# Patient Record
Sex: MALE | Race: ASIAN | NOT HISPANIC OR LATINO
[De-identification: names, ages, dates, MRNs, and addresses within clinical notes are randomized per-mention and may not be internally consistent; named-entity substitution may affect disease eponyms.]

---
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
## 2018-02-12 ENCOUNTER — APPOINTMENT (OUTPATIENT)
Dept: ENDOCRINOLOGY | Facility: CLINIC | Age: 18
End: 2018-02-12
Payer: COMMERCIAL

## 2018-02-12 VITALS
HEIGHT: 68 IN | OXYGEN SATURATION: 99 % | BODY MASS INDEX: 20 KG/M2 | HEART RATE: 87 BPM | SYSTOLIC BLOOD PRESSURE: 120 MMHG | WEIGHT: 132 LBS | DIASTOLIC BLOOD PRESSURE: 64 MMHG

## 2018-02-12 LAB
GLUCOSE BLDC GLUCOMTR-MCNC: 81
HBA1C MFR BLD HPLC: 8.7

## 2018-02-12 PROCEDURE — 83036 HEMOGLOBIN GLYCOSYLATED A1C: CPT | Mod: QW

## 2018-02-12 PROCEDURE — 99214 OFFICE O/P EST MOD 30 MIN: CPT

## 2018-02-12 PROCEDURE — 82962 GLUCOSE BLOOD TEST: CPT

## 2018-05-13 ENCOUNTER — LABORATORY RESULT (OUTPATIENT)
Age: 18
End: 2018-05-13

## 2018-05-14 ENCOUNTER — APPOINTMENT (OUTPATIENT)
Dept: ENDOCRINOLOGY | Facility: CLINIC | Age: 18
End: 2018-05-14
Payer: COMMERCIAL

## 2018-05-14 VITALS
OXYGEN SATURATION: 98 % | BODY MASS INDEX: 20.46 KG/M2 | HEART RATE: 100 BPM | HEIGHT: 68 IN | WEIGHT: 135 LBS | DIASTOLIC BLOOD PRESSURE: 80 MMHG | SYSTOLIC BLOOD PRESSURE: 120 MMHG

## 2018-05-14 DIAGNOSIS — Z83.3 FAMILY HISTORY OF DIABETES MELLITUS: ICD-10-CM

## 2018-05-14 LAB
GLUCOSE BLDC GLUCOMTR-MCNC: 228
HBA1C MFR BLD HPLC: 8

## 2018-05-14 PROCEDURE — 99214 OFFICE O/P EST MOD 30 MIN: CPT

## 2018-06-07 ENCOUNTER — APPOINTMENT (OUTPATIENT)
Dept: ENDOCRINOLOGY | Facility: CLINIC | Age: 18
End: 2018-06-07
Payer: COMMERCIAL

## 2018-06-07 PROCEDURE — G0108 DIAB MANAGE TRN  PER INDIV: CPT

## 2018-09-07 ENCOUNTER — APPOINTMENT (OUTPATIENT)
Dept: ENDOCRINOLOGY | Facility: CLINIC | Age: 18
End: 2018-09-07

## 2018-09-28 ENCOUNTER — APPOINTMENT (OUTPATIENT)
Dept: ENDOCRINOLOGY | Facility: CLINIC | Age: 18
End: 2018-09-28
Payer: COMMERCIAL

## 2018-09-28 LAB
GLUCOSE BLDC GLUCOMTR-MCNC: 108
HBA1C MFR BLD HPLC: 9

## 2018-09-28 PROCEDURE — 83036 HEMOGLOBIN GLYCOSYLATED A1C: CPT | Mod: QW

## 2018-09-28 PROCEDURE — 82962 GLUCOSE BLOOD TEST: CPT

## 2018-10-08 ENCOUNTER — CLINICAL ADVICE (OUTPATIENT)
Age: 18
End: 2018-10-08

## 2018-11-19 ENCOUNTER — APPOINTMENT (OUTPATIENT)
Dept: ENDOCRINOLOGY | Facility: CLINIC | Age: 18
End: 2018-11-19
Payer: COMMERCIAL

## 2018-11-19 VITALS
BODY MASS INDEX: 20 KG/M2 | WEIGHT: 132 LBS | OXYGEN SATURATION: 98 % | HEART RATE: 99 BPM | DIASTOLIC BLOOD PRESSURE: 70 MMHG | SYSTOLIC BLOOD PRESSURE: 120 MMHG | HEIGHT: 68 IN

## 2018-11-19 PROCEDURE — 99214 OFFICE O/P EST MOD 30 MIN: CPT

## 2019-05-10 ENCOUNTER — APPOINTMENT (OUTPATIENT)
Dept: ENDOCRINOLOGY | Facility: CLINIC | Age: 19
End: 2019-05-10
Payer: COMMERCIAL

## 2019-05-10 ENCOUNTER — TRANSCRIPTION ENCOUNTER (OUTPATIENT)
Age: 19
End: 2019-05-10

## 2019-05-10 VITALS
WEIGHT: 132 LBS | OXYGEN SATURATION: 98 % | SYSTOLIC BLOOD PRESSURE: 146 MMHG | BODY MASS INDEX: 20 KG/M2 | HEIGHT: 68 IN | HEART RATE: 136 BPM | DIASTOLIC BLOOD PRESSURE: 70 MMHG

## 2019-05-10 PROCEDURE — 99214 OFFICE O/P EST MOD 30 MIN: CPT

## 2019-05-10 NOTE — PHYSICAL EXAM
[No Acute Distress] : no acute distress [Alert] : alert [Well Nourished] : well nourished [Well Developed] : well developed [Normal Sclera/Conjunctiva] : normal sclera/conjunctiva [EOMI] : extra ocular movement intact [Normal Oropharynx] : the oropharynx was normal [No Proptosis] : no proptosis [No Thyroid Nodules] : there were no palpable thyroid nodules [No Respiratory Distress] : no respiratory distress [Thyroid Not Enlarged] : the thyroid was not enlarged [No Accessory Muscle Use] : no accessory muscle use [Normal S1, S2] : normal S1 and S2 [Normal Rate] : heart rate was normal  [Clear to Auscultation] : lungs were clear to auscultation bilaterally [Regular Rhythm] : with a regular rhythm [Pedal Pulses Normal] : the pedal pulses are present [No Edema] : there was no peripheral edema [Normal Bowel Sounds] : normal bowel sounds [Not Tender] : non-tender [Not Distended] : not distended [Soft] : abdomen soft [Post Cervical Nodes] : posterior cervical nodes [Anterior Cervical Nodes] : anterior cervical nodes [Axillary Nodes] : axillary nodes [No Spinal Tenderness] : no spinal tenderness [No Stigmata of Cushings Syndrome] : no stigmata of cushings syndrome [Spine Straight] : spine straight [Normal Gait] : normal gait [Normal Strength/Tone] : muscle strength and tone were normal [No Rash] : no rash [Full ROM] : with full range of motion [Normal] : normal [No Tremors] : no tremors [Normal Reflexes] : deep tendon reflexes were 2+ and symmetric [Oriented x3] : oriented to person, place, and time [Acanthosis Nigricans] : no acanthosis nigricans [Diminished Throughout Both Feet] : normal tactile sensation with monofilament testing throughout both feet

## 2019-05-10 NOTE — ASSESSMENT
[FreeTextEntry1] : Mr. ELI JENSEN is a pleasant 18 year old male with type 1 DM diagnosed at 18 months of age here for endocrine follo up. \par \par \par 1)Type 1 DM\par A1C today 05/10/2019 was 6.9%.  He does not have hypoglycemia. \par Notable to have high glucose level after breakfast. Insulin pump setting changes as below. \par Pump brand: MEDTRONIC \par Basal Rate: \par Start Time: 12:00 AM ----1.10 units\par Start Time: 8 -----  1.30 units/hour \par Start Time: 11 -----  1.10 units/hour \par Start Time: 17 ----- 1.40 units/hour \par Start Time: 20 -----  1.40 units/hour    \par Insulin to Carb Ration (I:C): \par Start Time: 12:00 AM -----10   carb/unit\par Start Time: 6 -----6.5  carb/unit --> 6.0 carb/unit\par Start Time: 12:30 ----- 7.5 carb/unit \par Start Time: 17:00 ----- 6.0 carb/unit\par Start stime: 20:00  ---- 12.0 carb/unit\par   \par Insulin Sensitivity Factor \par Start time: 0----80 \par start time:6:00 -- 75\par Start time: 20:00 --- 80\par \par BG Target =  \par \par Insulin on Board (IOB) Duration = 3 hours  \par Maximum Basal Rate = 2 units/hour \par Maximum Bolus = 15 units \par \par -will have patient repeat A1C in 1 month (last check Sept 2018) \par -He is up to date with thyroid screening, check thyroid function today (last check one year ago. \par -He has glucagon, ketone strips and medical bracelet. \par  [Hypoglycemia Management] : hypoglycemia management [Carbohydrate Consistent Diet] : carbohydrate consistent diet [Glucagon Use] : glucagon use [Ketone Testing] : ketone testing [Diabetes Foot Care] : diabetes foot care [Action and use of Insulin] : action and use of short and long-acting insulin [Insulin Self-Administration] : insulin self-administration [Injection Technique, Storage, Sharps Disposal] : injection technique, storage, and sharps disposal [Self Monitoring of Blood Glucose] : self monitoring of blood glucose [Retinopathy Screening] : Patient was referred to ophthalmology for retinopathy screening

## 2019-05-10 NOTE — HISTORY OF PRESENT ILLNESS
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  He still has to pump ongoing T4 at 1.5 years.  He is using DEXCOM G6 sensor.  \par He is now monitoring his glucose with a glucometer, but the DEXCOM 6 is really helping him to know what his glucose numbers throughout the day.\par \par Rex denies any hypoglycemic events recently.  He denies polyuria, polydipsia or blurry vision.\par He has very erratic schedule still. \par \par Pump Settings:\par Pump brand: MEDTRONIC \par Basal Rate: \par Start Time: 12:00 AM ----1.10 units\par Start Time: 8 -----  1.30 units/hour \par Start Time: 11 -----  1.10 units/hour \par Start Time: 17 ----- 1.40 units/hour \par Start Time: 20 -----  1.40 units/hour    \par Insulin to Carb Ration (I:C): \par Start Time: 12:00 AM -----10   carb/unit\par Start Time: 6 -----6.5  carb/unit\par Start Time: 12:30 ----- 7.5 carb/unit \par Start Time: 17:00 ----- 6.0 carb/unit\par Start stime: 20:00  ---- 12.0 carb/unit\par   \par Insulin Sensitivity Factor \par Start time: 0----80 \par start time:6:00 -- 75\par Start time: 20:00 --- 80\par \par BG Target =  \par \par Insulin on Board (IOB) Duration = 3 hours  \par Maximum Basal Rate = 2 units/hour \par Maximum Bolus = 15 units \par \par His schedule has been erratic. He goes to school full time, neuroscience major at [employer], [place] college student, goes to EMS training , career goal in medical field. 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  \par He is up to date with ophthalmology.  He has a follow up visit this year. \par He has no active podiatry issue. \par \par He denies any diarrhea, any GI symptoms.\par \par He denies any clinical symptoms of hypothyroidism such as weight gain, hair loss, or constipation.

## 2019-05-14 LAB
CHOLEST SERPL-MCNC: 172 MG/DL
CHOLEST/HDLC SERPL: 2.5 RATIO
CREAT SPEC-SCNC: 65 MG/DL
GLUCOSE BLDC GLUCOMTR-MCNC: 125
HBA1C MFR BLD HPLC: 6.9
HDLC SERPL-MCNC: 70 MG/DL
LDLC SERPL CALC-MCNC: 94 MG/DL
MICROALBUMIN 24H UR DL<=1MG/L-MCNC: <1.2 MG/DL
MICROALBUMIN/CREAT 24H UR-RTO: NORMAL MG/G
T4 FREE SERPL-MCNC: 1.3 NG/DL
TRIGL SERPL-MCNC: 39 MG/DL
TSH SERPL-ACNC: 1.32 UIU/ML

## 2019-12-24 ENCOUNTER — APPOINTMENT (OUTPATIENT)
Dept: ENDOCRINOLOGY | Facility: CLINIC | Age: 19
End: 2019-12-24
Payer: COMMERCIAL

## 2019-12-24 VITALS
HEART RATE: 112 BPM | HEIGHT: 68 IN | BODY MASS INDEX: 20.46 KG/M2 | WEIGHT: 135 LBS | OXYGEN SATURATION: 99 % | DIASTOLIC BLOOD PRESSURE: 80 MMHG | SYSTOLIC BLOOD PRESSURE: 120 MMHG

## 2019-12-24 LAB
GLUCOSE BLDC GLUCOMTR-MCNC: 163
HBA1C MFR BLD HPLC: 7.4

## 2019-12-24 PROCEDURE — 99214 OFFICE O/P EST MOD 30 MIN: CPT

## 2019-12-24 RX ORDER — SYRINGE AND NEEDLE,INSULIN,1ML 28GX1/2"
31G X 5/16" SYRINGE, EMPTY DISPOSABLE MISCELLANEOUS
Qty: 100 | Refills: 0 | Status: ACTIVE | OUTPATIENT
Start: 2019-12-24

## 2019-12-24 NOTE — ASSESSMENT
[Carbohydrate Consistent Diet] : carbohydrate consistent diet [Hypoglycemia Management] : hypoglycemia management [Glucagon Use] : glucagon use [Ketone Testing] : ketone testing [Sick-Day Management] : sick-day management [Diabetes Foot Care] : diabetes foot care [Importance of Diet and Exercise] : importance of diet and exercise to improve glycemic control, achieve weight loss and improve cardiovascular health [Long Term Vascular Complications] : long term vascular complications of diabetes [Action and use of Insulin] : action and use of short and long-acting insulin [Self Monitoring of Blood Glucose] : self monitoring of blood glucose [Injection Technique, Storage, Sharps Disposal] : injection technique, storage, and sharps disposal [Retinopathy Screening] : Patient was referred to ophthalmology for retinopathy screening [Insulin Self-Administration] : insulin self-administration [FreeTextEntry1] : Mr. ELI JENSEN is a pleasant 18 year old male with type 1 DM diagnosed at 18 months of age here for endocrine follo up. \par \par \par 1)Type 1 DM\par He goes to the eye doctor in Feb 2020.  \par No issue with his foot.  \par A1C today 05/10/2019 was 6.9%.  He does not have hypoglycemia. \par Notable to have high glucose level after breakfast. Insulin pump setting changes as below. \par Pump brand: MEDTRONIC \par Basal Rate: \par Start Time: 12:00 AM ----1.10 units\par Start Time: 8 -----  1.30 units/hour \par Start Time: 11 -----  1.10 units/hour \par Start Time: 17 ----- 1.40 units/hour \par Start Time: 20 -----  1.30 units/hour    \par Insulin to Carb Ration (I:C): \par Start Time: 12:00 AM -----10   carb/unit\par Start Time: 6 -----6.0 carb/unit --> 5.5 carb/unit.  \par Start Time: 12:30 ----- 7.5 carb/unit \par Start Time: 17:00 ----- 6.0 carb/unit\par Start stime: 20:00  ---- 10.0 carb/unit\par   \par Insulin Sensitivity Factor \par Start time: 0----80 \par start time:6:00 -- 75\par Start time: 20:00 --- 80\par \par BG Target =  \par \par Insulin on Board (IOB) Duration = 3 hours  \par Maximum Basal Rate = 2 units/hour \par Maximum Bolus = 15 units \par -He is up to date with thyroid screening, check thyroid function today (last check one year ago. \par -He has glucagon, ketone strips and medical bracelet. \par -I asked patient to share his clarity go with us in one month.  I would consider increasing his basal insulin by about 10% for each of the rate next time if his glycemic control is not on target.\par

## 2019-12-24 NOTE — HISTORY OF PRESENT ILLNESS
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  This occurred around 2014.  There was a history of the same early in childhood.\par \par His A1c today was 7.5%.  He reports that the semester have been quite stressful.  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  He is neuroscience  major.  Planning to go to medical school.  His cognitive wants to get.  He is going to [place] for 3 weeks starting January 2024 study abroadprogram for 3 weeks.  He will need his insulin in paper prescriptions. 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  He still has to pump ongoing T4 at 1.5 years.  He is using DEXCOM G6 sensor.  \par \par He is now monitoring his glucose with a glucometer, but the DEXCOM 6 is really helping him to know what his glucose numbers throughout the day.\par \par Rex denies any hypoglycemic events recently.  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  \par \par He is up to date with ophthalmology.  He has a follow up visit this year. \par \par He has no active podiatry issue. \par \par He denies any diarrhea, any GI symptoms.\par \par He denies any clinical symptoms of hypothyroidism such as weight gain, hair loss, or constipation.

## 2019-12-24 NOTE — PHYSICAL EXAM
[Alert] : alert [Well Nourished] : well nourished [No Acute Distress] : no acute distress [Well Developed] : well developed [Normal Sclera/Conjunctiva] : normal sclera/conjunctiva [Normal Oropharynx] : the oropharynx was normal [EOMI] : extra ocular movement intact [No Proptosis] : no proptosis [Thyroid Not Enlarged] : the thyroid was not enlarged [No Thyroid Nodules] : there were no palpable thyroid nodules [No Respiratory Distress] : no respiratory distress [No Accessory Muscle Use] : no accessory muscle use [Normal Rate] : heart rate was normal  [Clear to Auscultation] : lungs were clear to auscultation bilaterally [Normal S1, S2] : normal S1 and S2 [Regular Rhythm] : with a regular rhythm [Pedal Pulses Normal] : the pedal pulses are present [Normal Bowel Sounds] : normal bowel sounds [No Edema] : there was no peripheral edema [Not Tender] : non-tender [Soft] : abdomen soft [Post Cervical Nodes] : posterior cervical nodes [Not Distended] : not distended [Anterior Cervical Nodes] : anterior cervical nodes [Axillary Nodes] : axillary nodes [No Spinal Tenderness] : no spinal tenderness [Spine Straight] : spine straight [Normal Gait] : normal gait [No Stigmata of Cushings Syndrome] : no stigmata of cushings syndrome [Normal Strength/Tone] : muscle strength and tone were normal [No Rash] : no rash [Normal] : normal [Full ROM] : with full range of motion [Normal Reflexes] : deep tendon reflexes were 2+ and symmetric [No Tremors] : no tremors [Oriented x3] : oriented to person, place, and time [Acanthosis Nigricans] : no acanthosis nigricans [Diminished Throughout Both Feet] : normal tactile sensation with monofilament testing throughout both feet

## 2020-02-27 ENCOUNTER — RX RENEWAL (OUTPATIENT)
Age: 20
End: 2020-02-27

## 2020-03-27 ENCOUNTER — APPOINTMENT (OUTPATIENT)
Dept: ENDOCRINOLOGY | Facility: CLINIC | Age: 20
End: 2020-03-27
Payer: COMMERCIAL

## 2020-03-27 VITALS
TEMPERATURE: 97.6 F | HEART RATE: 100 BPM | WEIGHT: 137 LBS | DIASTOLIC BLOOD PRESSURE: 80 MMHG | BODY MASS INDEX: 20.76 KG/M2 | HEIGHT: 68 IN | OXYGEN SATURATION: 97 % | SYSTOLIC BLOOD PRESSURE: 120 MMHG

## 2020-03-27 LAB
GLUCOSE BLDC GLUCOMTR-MCNC: 122
HBA1C MFR BLD HPLC: 7.3

## 2020-03-27 PROCEDURE — 99214 OFFICE O/P EST MOD 30 MIN: CPT

## 2020-03-27 NOTE — ASSESSMENT
[FreeTextEntry1] : Mr. ELI JENSEN is a pleasant 18 year old male with type 1 DM diagnosed at 18 months of age here for endocrine follo up. \par \par \par 1)Type 1 DM\par He goes to the eye doctor in Feb 2020.  \par Using Dexcom G6 \par No issue with his foot.  \par A1C today 05/10/2019 was 6.9%.  He does not have hypoglycemia. \par Notable to have high glucose level after breakfast. Insulin pump setting changes as below. \par Pump brand: MEDTRONIC \par Basal Rate: \par Start Time: 12:00 AM -- 0.950\par Start Time: 7 -----  1.25 units/hour \par Start Time: 11 -----  1.10 units/hour \par Start Time: 17 ----- 1.25 units/hour \par Start Time: 20 -----  1.30 units/hour    \par Insulin to Carb Ration (I:C): \par Start Time: 12:00 AM -----10   carb/unit\par Start Time: 6 -----6.0 carb/unit  \par Start Time: 12:30 ----- 7.5 carb/unit \par Start Time: 17:00 ----- 6.0 carb/unit, recommend that if he has recurrent postprandial hyperglycemia after dinner changed his ratio to 5.0 carbs per units.\par Start stime: 20:00  ---- 10.0 carb/unit\par   \par Insulin Sensitivity Factor \par Start time: 0----80 \par start time:6:00 -- 75\par Start time: 20:00 --- 80\par \par BG Target =  \par \par Insulin on Board (IOB) Duration = 3 hours  \par Maximum Basal Rate = 2 units/hour \par Maximum Bolus = 15 units \par -He has glucagon, ketone strips and medical bracelet. \par \par \par #2 Screening for Thyroid and Celiac Disease\par Thyroid function check in May 2019 within normal limits.\par Will obtain celiac screening next visit.  Last done in April 2017 with pediatric endocrinology.\par

## 2020-03-27 NOTE — HISTORY OF PRESENT ILLNESS
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  This occurred around 2014.  There was a history of the same early in childhood.\par \par His A1c is 7.4 today.  His average has been between 7 to 8% over the last few years.  He is currently in his senior years of college.  He goes to [employer].  He is applying for medical school next coming year.  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  He still has to pump ongoing T4 at 1.5 years.  He is using DEXCOM G6 sensor.\par \par He is now monitoring his glucose with a glucometer, but the DEXCOM 6 is really helping him to know what his glucose numbers throughout the day.\par \par Rex denies any hypoglycemic events recently.  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  \par \par He is up to date with ophthalmology.  He has a follow up visit this year. \par \par He has no active podiatry issue. \par \par He denies any diarrhea, any GI symptoms.\par \par He denies any clinical symptoms of hypothyroidism such as weight gain, hair loss, or constipation.\par \par Tammie reported that he had changed his basal insulin dosage over the last few months due to hypoglycemia in the morning.

## 2020-07-10 ENCOUNTER — APPOINTMENT (OUTPATIENT)
Dept: ENDOCRINOLOGY | Facility: CLINIC | Age: 20
End: 2020-07-10
Payer: COMMERCIAL

## 2020-07-10 VITALS
HEIGHT: 68 IN | SYSTOLIC BLOOD PRESSURE: 120 MMHG | BODY MASS INDEX: 20.16 KG/M2 | TEMPERATURE: 99.8 F | WEIGHT: 133 LBS | HEART RATE: 107 BPM | OXYGEN SATURATION: 97 % | DIASTOLIC BLOOD PRESSURE: 70 MMHG

## 2020-07-10 LAB
GLUCOSE BLDC GLUCOMTR-MCNC: 154
HBA1C MFR BLD HPLC: 6.6

## 2020-07-10 PROCEDURE — 82962 GLUCOSE BLOOD TEST: CPT

## 2020-07-10 PROCEDURE — 95251 CONT GLUC MNTR ANALYSIS I&R: CPT

## 2020-07-10 PROCEDURE — 83036 HEMOGLOBIN GLYCOSYLATED A1C: CPT | Mod: QW

## 2020-07-10 PROCEDURE — 99213 OFFICE O/P EST LOW 20 MIN: CPT | Mod: 25

## 2020-07-10 NOTE — ASSESSMENT
[FreeTextEntry1] : Mr. REX JENSEN is a pleasant 20 year old male with type 1 DM diagnosed at 18 months of age here for endocrine follo up. \par \par 1)Type 1 DM\par Well-controlled today 6.6%.  Notable for hypoglycemia overnight, in the setting of eating a snack that contains a lot of carbohydrate for example a bowl of cereal.  Rex will adjust his snack carbohydrate size.  We will also increase the basal unit starting at 2000 from 1.1 units/h to 1.3 units/h.\par Using Dexcom G6 \par No issue with his foot.  \par Pump brand: MEDTRONIC \par Basal Rate: \par Start Time: 12:00 AM -- 0.950\par Start Time: 7 ----- 1.05 units/hour \par Start Time: 11 ----- 1.15 units/hour \par Start Time: 17 ----- 1.25 units/hour \par Start Time: 20 ----- 1.10 units/hour  --> 1.3\par Insulin to Carb Ration (I:C): \par Start Time: 12:00 AM -----10 carb/unit\par Start Time: 6 -----6.0 carb/unit \par Start Time: 12:30 ----- 8.0 carb/unit \par Start Time: 17:00 ----- 7.0 carb/unit\par Start stime: 20:00 ---- 10.0 carb/unit\par  \par Insulin Sensitivity Factor \par Start time: 0----80 \par start time:6:00 -- 75\par Start time: 20:00 --- 80\par \par BG Target =  \par \par Insulin on Board (IOB) Duration = 3 hours \par Maximum Basal Rate = 2 units/hour \par Maximum Bolus = 15 units \par \par Usually gets supplies Via [employer] insurance.  He is interested in getting the tandem insulin pump.\par Eye doctor going to see next week.  \par We will send annual diabetes type 1 labs including microalbumin/creatinine ratio, lipid profile and thyroid function as well as celiac screening [Carbohydrate Consistent Diet] : carbohydrate consistent diet [Long Term Vascular Complications] : long term vascular complications of diabetes [Diabetes Foot Care] : diabetes foot care [Hypoglycemia Management] : hypoglycemia management [Glucagon Use] : glucagon use [Importance of Diet and Exercise] : importance of diet and exercise to improve glycemic control, achieve weight loss and improve cardiovascular health [Exercise/Effect on Glucose] : exercise/effect on glucose [Self Monitoring of Blood Glucose] : self monitoring of blood glucose [Action and use of Insulin] : action and use of short and long-acting insulin [Ketone Testing] : ketone testing [Sick-Day Management] : sick-day management [Insulin Self-Administration] : insulin self-administration [Injection Technique, Storage, Sharps Disposal] : injection technique, storage, and sharps disposal [Retinopathy Screening] : Patient was referred to ophthalmology for retinopathy screening

## 2020-07-10 NOTE — HISTORY OF PRESENT ILLNESS
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  This occurred around 2014.  There was a history of the same early in childhood.  No known history of diabetic ketoacidosis.\par \par His A1c is 6.6% today, 7/10/2020, his average has been between 7 to 8% over the last few years.  Patient is currently taking eucapnia prior to his applying to medical school.  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  He still has to pump ongoing T4 at 1.5 years.  He is using DEXCOM G6 sensor.\par \par He is now monitoring his glucose with a glucometer, but the DEXCOM 6 is really helping him to know what his glucose numbers throughout the day.\par \par Rex denies any hypoglycemic events recently.  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  \par \par He is up to date with ophthalmology.  He has a follow up visit this year. \par \par He is up around 9:30-10am, usually eating three meals daily.  A snack before bed.  If he doesn’t eat at all, his glucose drops.\par \par He has no active podiatry issue. \par \par He denies any diarrhea, any GI symptoms.\par \par He denies any clinical symptoms of hypothyroidism such as weight gain, hair loss, or constipation.\par \par Tammie reported that he had changed his basal insulin dosage over the last few months due to hypoglycemia in the morning. [FreeTextEntry3] : 42 [FreeTextEntry2] : 55 [FreeTextEntry4] : 2

## 2020-07-10 NOTE — PHYSICAL EXAM
[Alert] : alert [Well Nourished] : well nourished [Normal Sclera/Conjunctiva] : normal sclera/conjunctiva [Well Developed] : well developed [No Acute Distress] : no acute distress [No Proptosis] : no proptosis [EOMI] : extra ocular movement intact [No Thyroid Nodules] : no palpable thyroid nodules [Normal Oropharynx] : the oropharynx was normal [Thyroid Not Enlarged] : the thyroid was not enlarged [No Accessory Muscle Use] : no accessory muscle use [Clear to Auscultation] : lungs were clear to auscultation bilaterally [No Respiratory Distress] : no respiratory distress [Normal Rate] : heart rate was normal [Normal S1, S2] : normal S1 and S2 [Regular Rhythm] : with a regular rhythm [Normal Bowel Sounds] : normal bowel sounds [Pedal Pulses Normal] : the pedal pulses are present [No Edema] : no peripheral edema [Not Tender] : non-tender [Normal Anterior Cervical Nodes] : no anterior cervical lymphadenopathy [Soft] : abdomen soft [Not Distended] : not distended [No Spinal Tenderness] : no spinal tenderness [Normal Posterior Cervical Nodes] : no posterior cervical lymphadenopathy [Normal Gait] : normal gait [No Stigmata of Cushings Syndrome] : no stigmata of Cushings Syndrome [Spine Straight] : spine straight [Normal Strength/Tone] : muscle strength and tone were normal [No Rash] : no rash [No Tremors] : no tremors [Normal Reflexes] : deep tendon reflexes were 2+ and symmetric [Oriented x3] : oriented to person, place, and time [Acanthosis Nigricans] : no acanthosis nigricans

## 2020-07-31 ENCOUNTER — RX RENEWAL (OUTPATIENT)
Age: 20
End: 2020-07-31

## 2020-09-30 ENCOUNTER — RX RENEWAL (OUTPATIENT)
Age: 20
End: 2020-09-30

## 2020-10-23 ENCOUNTER — APPOINTMENT (OUTPATIENT)
Dept: ENDOCRINOLOGY | Facility: CLINIC | Age: 20
End: 2020-10-23
Payer: COMMERCIAL

## 2020-10-23 VITALS
DIASTOLIC BLOOD PRESSURE: 80 MMHG | HEIGHT: 68 IN | SYSTOLIC BLOOD PRESSURE: 110 MMHG | WEIGHT: 132 LBS | TEMPERATURE: 98.7 F | BODY MASS INDEX: 20 KG/M2

## 2020-10-23 LAB
GLUCOSE BLDC GLUCOMTR-MCNC: 98
HBA1C MFR BLD HPLC: 7

## 2020-10-23 PROCEDURE — 99072 ADDL SUPL MATRL&STAF TM PHE: CPT

## 2020-10-23 PROCEDURE — 99214 OFFICE O/P EST MOD 30 MIN: CPT | Mod: 25

## 2020-10-23 NOTE — HISTORY OF PRESENT ILLNESS
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  This occurred around 2014.  There was a history of the same early in childhood.  No known history of diabetic ketoacidosis.  A1c relatively well managed, between 6 to 7%.  Today October 23, 2020 was 7.0%.  His average has been between 7 to 8% over the last few years.  Patient is currently taking eucapnia prior to his applying to medical school.  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 \par \par In July 2020 patient switched to T slim tandem PUMP with control IQ along with Dexcom G6 sensor. \par \par Rex denies any hypoglycemic events recently.  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  \par \par He is up to date with ophthalmology.  He has a follow up visit this year. \par \par He is up around 9:30-10am, usually eating three meals daily.  A snack before bed.  If he doesn’t eat at all, his glucose drops.\par \par He has no active podiatry issue. \par \par He denies any diarrhea, any GI symptoms.\par \par He denies any clinical symptoms of hypothyroidism such as weight gain, hair loss, or constipation.

## 2020-10-23 NOTE — ASSESSMENT
[FreeTextEntry1] : 20-year-old male with type 1 diabetes, diagnosed at 18 months of age, here for endocrinology follow-up.\par \par #1 Type 1 Diabetes Mellitus\par Patient is currently using insulin pump with tandem, T slim, with control [employer] technology.\par Timing use 92%.\par Highest CGM reading 361 mg/dL, average CGM reading 167 mg/dL, lowest CGM reading 72 mg/dL.\par Patient is on target 67% of times, above 180 mg/dL about 33% of the time.  There is no hypoglycemia.\par Average total daily dose is about 20.10 units/day.\par Patient mentioned that he is eating later at nighttime.  Notable to have hypoglycemia between 12 AM to 4 AM.\par Recommend to change insulin to carbohydrate ratio 1:10 to 1: 8 at 8 PM.\par Otherwise all settings remains the same.\par Diabetes Therapy Regimen \par Date: 10/23/2020 \par Bolus Insulin: Novolog \par Basal Rate \par Start Time: 0000 Basal: 0.900 units/hour \par Start Time: 6:30am Basal: 1.050 units/hour \par Start Time: Noon Basal: 1.150 units/hour \par Start Time: 5pm Basal: 1.30 units/hour \par Start Time: 8pm Basal: 1.30 units/hour \par TOTAL INSULIN 26.48 UNIT BASAL     \par Carb Ratios \par Start Time: 0000 Carb Ratios: 10 grams/unit \par Start Time: 630AM Carb Ratios: 7 grams/unit \par Start Time: 1200 Carb Ratios: 7.5 grams/unit \par Start Time: 5PM Carb Ratios: 7.0 grams/unit \par Start Time: 8PM Carb Ratios: 8 grams/unit     \par Sensitvity \par Start Time: 0000 Sensitivity: 80 mg/dL/U \par Start Time: 6:30AM Sensitivity: 75 mg/dL/U \par Start Time: 8PM Sensitivity: 80 mg/dL/U       \par BG Target Ranges \par Start Time: MIDNIGHT BG Target Ranges: 110 mg/dL         \par  Active time 3 hours. \par Patient is up-to-date with ophthalmologist\par Patient has no active issue with his feet\par Patient has medical bracelet, glucagon, aware of hypoglycemic protocol.\par \par \par \par Usually gets supplies Via [employer] insurance.  He is interested in getting the tandem insulin pump.\par Eye doctor going to see next week.  \par We will send annual diabetes type 1 labs including microalbumin/creatinine ratio, lipid profile and thyroid function as well as celiac screening [Diabetes Foot Care] : diabetes foot care [Long Term Vascular Complications] : long term vascular complications of diabetes [Carbohydrate Consistent Diet] : carbohydrate consistent diet [Importance of Diet and Exercise] : importance of diet and exercise to improve glycemic control, achieve weight loss and improve cardiovascular health [Exercise/Effect on Glucose] : exercise/effect on glucose [Hypoglycemia Management] : hypoglycemia management [Glucagon Use] : glucagon use [Ketone Testing] : ketone testing [Action and use of Insulin] : action and use of short and long-acting insulin [Self Monitoring of Blood Glucose] : self monitoring of blood glucose [Insulin Self-Administration] : insulin self-administration [Injection Technique, Storage, Sharps Disposal] : injection technique, storage, and sharps disposal [Sick-Day Management] : sick-day management [Retinopathy Screening] : Patient was referred to ophthalmology for retinopathy screening

## 2021-06-04 ENCOUNTER — APPOINTMENT (OUTPATIENT)
Dept: ENDOCRINOLOGY | Facility: CLINIC | Age: 21
End: 2021-06-04
Payer: COMMERCIAL

## 2021-06-04 VITALS
OXYGEN SATURATION: 98 % | SYSTOLIC BLOOD PRESSURE: 126 MMHG | TEMPERATURE: 98.6 F | DIASTOLIC BLOOD PRESSURE: 84 MMHG | HEART RATE: 68 BPM | WEIGHT: 130 LBS

## 2021-06-04 DIAGNOSIS — Z00.00 ENCOUNTER FOR GENERAL ADULT MEDICAL EXAMINATION W/OUT ABNORMAL FINDINGS: ICD-10-CM

## 2021-06-04 PROCEDURE — 99072 ADDL SUPL MATRL&STAF TM PHE: CPT

## 2021-06-04 PROCEDURE — 99214 OFFICE O/P EST MOD 30 MIN: CPT

## 2021-06-04 NOTE — HISTORY OF PRESENT ILLNESS
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  There was a history of the same early in childhood.  No known history of diabetic ketoacidosis.  A1c relatively well managed, between 6 to 7%.  Today October 23, 2020 was 7.0%.  His average has been between 7 to 8% over the last few years.  Patient is currently taking eucapnia prior to his applying to medical school.  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 \par \par In July 2020 patient switched to T slim tandem PUMP with control IQ along with Dexcom G6 sensor. \par \par Rex denies any hypoglycemic events recently.  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  \par \par He is up to date with ophthalmology.  He has a follow up visit this year. \par \par He is up around 9:30-10am, usually eating three meals daily.  A snack before bed.  If he doesn’t eat at all, his glucose drops.\par \par He has no active podiatry issue. \par \par He denies any diarrhea, any GI symptoms.\par \par He denies any clinical symptoms of hypothyroidism such as weight gain, hair loss, or constipation.

## 2021-06-04 NOTE — PHYSICAL EXAM
[Alert] : alert [Well Nourished] : well nourished [No Acute Distress] : no acute distress [Well Developed] : well developed [Normal Sclera/Conjunctiva] : normal sclera/conjunctiva [EOMI] : extra ocular movement intact [No Proptosis] : no proptosis [Normal Oropharynx] : the oropharynx was normal [Thyroid Not Enlarged] : the thyroid was not enlarged [No Thyroid Nodules] : no palpable thyroid nodules [No Respiratory Distress] : no respiratory distress [No Accessory Muscle Use] : no accessory muscle use [Clear to Auscultation] : lungs were clear to auscultation bilaterally [Normal S1, S2] : normal S1 and S2 [Normal Rate] : heart rate was normal [Regular Rhythm] : with a regular rhythm [No Edema] : no peripheral edema [Pedal Pulses Normal] : the pedal pulses are present [Normal Bowel Sounds] : normal bowel sounds [Not Tender] : non-tender [Not Distended] : not distended [Soft] : abdomen soft [Normal Anterior Cervical Nodes] : no anterior cervical lymphadenopathy [No Spinal Tenderness] : no spinal tenderness [Spine Straight] : spine straight [No Stigmata of Cushings Syndrome] : no stigmata of Cushings Syndrome [Normal Gait] : normal gait [Normal Strength/Tone] : muscle strength and tone were normal [No Rash] : no rash [Acanthosis Nigricans] : no acanthosis nigricans [Normal] : normal [Full ROM] : with full range of motion [Diminished Throughout Both Feet] : normal tactile sensation with monofilament testing throughout both feet [Normal Reflexes] : deep tendon reflexes were 2+ and symmetric [No Tremors] : no tremors [Oriented x3] : oriented to person, place, and time

## 2021-06-04 NOTE — ASSESSMENT
[FreeTextEntry1] : 20-year-old male with type 1 diabetes, diagnosed at 18 months of age, here for endocrinology follow-up.\par \par 1.  Type 1 diabetes mellitus.\par Patient is currently using insulin pump with tandem, T slim, with control IQ technology.\par Unable to download the pump today despite multipe tries, there's website issues. \par Patient mentioned that he is eating later at nighttime.  Notable to have hypoglycemia between 12 AM to 4 AM.\par Recommend to change insulin to carbohydrate ratio 1:10 to 1: 8 at 8 PM.\par Otherwise all settings remains the same.\par Diabetes Therapy Regimen \par Date 06/04/2021 \par Bolus Insulin: Novolog \par Basal Rate \par Start Time: 0000 Basal: 0.900 units/hour \par Start Time: 6:30am Basal: 1.050 units/hour \par Start Time: Noon Basal: 1.150 units/hour \par Start Time: 5pm Basal: 1.30 units/hour \par Start Time: 8pm Basal: 1.30 units/hour \par TOTAL INSULIN 26.475 UNIT BASAL     \par Carb Ratios \par Start Time: 0000 Carb Ratios: 10 grams/unit \par Start Time: 630AM Carb Ratios: 7 grams/unit \par Start Time: 1200 Carb Ratios: 7.5 grams/unit \par Start Time: 5PM Carb Ratios: 7.0 grams/unit \par Start Time: 8PM Carb Ratios: 8 grams/unit     \par Sensitivity \par Start Time: 0000 Sensitivity: 80 mg/dL/U \par Start Time: 6:30AM Sensitivity: 75 mg/dL/U \par Start Time: 8PM Sensitivity: 80 mg/dL/U       \par BG Target Ranges \par Start Time: MIDNIGHT BG Target Ranges: 110 mg/dL         \par  Active time 3 hours. \par Patient is up-to-date with ophthalmologist, saw last year, due for the visit \par Foot exam 06/04/2021 is within normal limits. \par Patient has medical bracelet, glucagon, aware of hypoglycemic protocol.\par Usually gets supplies Via [employer] insurance.  He is interested in getting the tandem insulin pump.\par Eye doctor going to see next week.  \par We will send annual diabetes type 1 labs including microalbumin/creatinine ratio, lipid profile and thyroid function as well as celiac screening\par \par \par

## 2021-06-07 LAB
ALBUMIN SERPL ELPH-MCNC: 4.4 G/DL
ALP BLD-CCNC: 46 U/L
ALT SERPL-CCNC: 14 U/L
ANION GAP SERPL CALC-SCNC: 13 MMOL/L
AST SERPL-CCNC: 21 U/L
BILIRUB SERPL-MCNC: 0.5 MG/DL
BUN SERPL-MCNC: 11 MG/DL
CALCIUM SERPL-MCNC: 9.7 MG/DL
CHLORIDE SERPL-SCNC: 102 MMOL/L
CHOLEST SERPL-MCNC: 191 MG/DL
CO2 SERPL-SCNC: 24 MMOL/L
CREAT SERPL-MCNC: 0.75 MG/DL
CREAT SPEC-SCNC: 201 MG/DL
ESTIMATED AVERAGE GLUCOSE: 166 MG/DL
GLUCOSE SERPL-MCNC: 125 MG/DL
HBA1C MFR BLD HPLC: 7.4 %
HDLC SERPL-MCNC: 87 MG/DL
LDLC SERPL CALC-MCNC: 96 MG/DL
MICROALBUMIN 24H UR DL<=1MG/L-MCNC: 1.3 MG/DL
MICROALBUMIN/CREAT 24H UR-RTO: 6 MG/G
NONHDLC SERPL-MCNC: 104 MG/DL
POTASSIUM SERPL-SCNC: 4.3 MMOL/L
PROT SERPL-MCNC: 6.9 G/DL
SODIUM SERPL-SCNC: 140 MMOL/L
T3FREE SERPL-MCNC: 3.19 PG/ML
TRIGL SERPL-MCNC: 43 MG/DL
TSH SERPL-ACNC: 1.42 UIU/ML
TTG IGA SER IA-ACNC: <1.2 U/ML
TTG IGA SER-ACNC: NEGATIVE
TTG IGG SER IA-ACNC: 1.5 U/ML
TTG IGG SER IA-ACNC: NEGATIVE

## 2021-10-04 ENCOUNTER — APPOINTMENT (OUTPATIENT)
Dept: ENDOCRINOLOGY | Facility: CLINIC | Age: 21
End: 2021-10-04
Payer: COMMERCIAL

## 2021-10-04 VITALS
WEIGHT: 138 LBS | OXYGEN SATURATION: 99 % | HEIGHT: 68 IN | BODY MASS INDEX: 20.92 KG/M2 | DIASTOLIC BLOOD PRESSURE: 90 MMHG | SYSTOLIC BLOOD PRESSURE: 130 MMHG | HEART RATE: 83 BPM | TEMPERATURE: 98 F

## 2021-10-04 LAB
GLUCOSE BLDC GLUCOMTR-MCNC: 203
HBA1C MFR BLD HPLC: 7.1

## 2021-10-04 PROCEDURE — 83036 HEMOGLOBIN GLYCOSYLATED A1C: CPT | Mod: QW

## 2021-10-04 PROCEDURE — 99214 OFFICE O/P EST MOD 30 MIN: CPT | Mod: 25

## 2021-10-04 PROCEDURE — 82962 GLUCOSE BLOOD TEST: CPT

## 2021-10-04 NOTE — ASSESSMENT
[FreeTextEntry1] : 21-year-old male with type 1 diabetes, diagnosed at 18 months of age, here for endocrinology follow-up.\par \par 1.  Type 1 diabetes mellitus.\par Patient is currently using insulin pump with tandem, T slim, with control IQ technology.\par Eating later at night time, notable for hyperglycemia overnight\par Rarely any hypoglycemia between 12-6am. \par Will increase Carb ratio between midnight to AM\par Otherwise all settings remains the same.\par Diabetes Therapy Regimen \par Date 06/04/2021 \par Bolus Insulin: Novolog \par Basal Rate \par Start Time: 0000 Basal: 0.900 units/hour \par Start Time: 6:30am Basal: 1.050 units/hour \par Start Time: Noon Basal: 1.150 units/hour \par Start Time: 5pm Basal: 1.30 units/hour \par Start Time: 8pm Basal: 1.30 units/hour \par TOTAL INSULIN 26.475 UNIT BASAL     \par Carb Ratios \par Start Time: 0000 Carb Ratios: 10 grams/unit \par Start Time: 630AM Carb Ratios: 7 grams/unit \par Start Time: 1200 Carb Ratios: 7.5 grams/unit \par Start Time: 5PM Carb Ratios: 7.0 grams/unit \par Start Time: 8PM Carb Ratios: 8 grams/unit==> 7.5 grams/unit.  \par Sensitivity \par Start Time: 0000 Sensitivity: 80 mg/dL/U \par Start Time: 6:30AM Sensitivity: 75 mg/dL/U \par Start Time: 8PM Sensitivity: 80 mg/dL/U       \par BG Target Ranges \par Start Time: MIDNIGHT BG Target Ranges: 110 mg/dL         \par  Active time 3 hours. \par Patient is up-to-date with ophthalmologist, saw last year, due for the visit \par Foot exam 06/04/2021 is within normal limits. \par Patient has medical bracelet, glucagon, aware of hypoglycemic protocol.\par Usually gets supplies Via [employer] insurance. \par Up to date with eye doctor in June 2021.  \par \par We will send annual diabetes type 1 labs including microalbumin/creatinine ratio, lipid profile and thyroid function as well as celiac screening\par \par CDE FU in 6 months\par FU with me afterwards. \par Call the office if any hyper or hypoglycemia. \par Has ketone, glucagon and all supplies.

## 2021-10-04 NOTE — HISTORY OF PRESENT ILLNESS
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  There was a history of the same early in childhood.  No known history of diabetic ketoacidosis.  A1c relatively well managed, between 6 to 7%.  Today October 23, 2020 was 7.0%.  His average has been between 7 to 8% over the last few years.  Patient is currently taking eucapnia prior to his applying to medical school.  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 \par \par In July 2020 patient switched to T slim tandem PUMP with control IQ along with Dexcom G6 sensor. \par \par Rex denies any hypoglycemic events recently.  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  \par \par He is up to date with ophthalmology.  He has a follow up visit this year. \par \par He is up around 9:30-10am, usually eating three meals daily.  A snack before bed.  If he doesn’t eat at all, his glucose drops.\par \par He has no active podiatry issue. \par \par He denies any diarrhea, any GI symptoms.\par \par He denies any clinical symptoms of hypothyroidism such as weight gain, hair loss, or constipation.\par \par Applying to grad school for biomed gradudate degree.

## 2021-10-04 NOTE — PHYSICAL EXAM
[Alert] : alert [Well Nourished] : well nourished [No Acute Distress] : no acute distress [Well Developed] : well developed [Normal Sclera/Conjunctiva] : normal sclera/conjunctiva [EOMI] : extra ocular movement intact [No Proptosis] : no proptosis [Normal Oropharynx] : the oropharynx was normal [Thyroid Not Enlarged] : the thyroid was not enlarged [No Thyroid Nodules] : no palpable thyroid nodules [No Respiratory Distress] : no respiratory distress [No Accessory Muscle Use] : no accessory muscle use [Clear to Auscultation] : lungs were clear to auscultation bilaterally [Normal S1, S2] : normal S1 and S2 [Normal Rate] : heart rate was normal [Regular Rhythm] : with a regular rhythm [No Edema] : no peripheral edema [Pedal Pulses Normal] : the pedal pulses are present [Not Tender] : non-tender [Normal Bowel Sounds] : normal bowel sounds [Not Distended] : not distended [Soft] : abdomen soft [Normal Anterior Cervical Nodes] : no anterior cervical lymphadenopathy [No Spinal Tenderness] : no spinal tenderness [Spine Straight] : spine straight [No Stigmata of Cushings Syndrome] : no stigmata of Cushings Syndrome [Normal Gait] : normal gait [Normal Strength/Tone] : muscle strength and tone were normal [No Rash] : no rash [Acanthosis Nigricans] : no acanthosis nigricans [Normal] : normal [Full ROM] : with full range of motion [Diminished Throughout Both Feet] : normal tactile sensation with monofilament testing throughout both feet [Normal Reflexes] : deep tendon reflexes were 2+ and symmetric [No Tremors] : no tremors [Oriented x3] : oriented to person, place, and time

## 2021-10-29 ENCOUNTER — APPOINTMENT (OUTPATIENT)
Dept: ENDOCRINOLOGY | Facility: CLINIC | Age: 21
End: 2021-10-29

## 2021-11-11 ENCOUNTER — RX RENEWAL (OUTPATIENT)
Age: 21
End: 2021-11-11

## 2022-04-08 ENCOUNTER — APPOINTMENT (OUTPATIENT)
Dept: ENDOCRINOLOGY | Facility: CLINIC | Age: 22
End: 2022-04-08
Payer: COMMERCIAL

## 2022-04-08 LAB — HBA1C MFR BLD HPLC: 6.9

## 2022-04-08 PROCEDURE — 83036 HEMOGLOBIN GLYCOSYLATED A1C: CPT | Mod: QW

## 2022-04-08 PROCEDURE — G0108 DIAB MANAGE TRN  PER INDIV: CPT

## 2022-04-08 PROCEDURE — 82962 GLUCOSE BLOOD TEST: CPT

## 2022-04-13 RX ORDER — LANCETS 28 GAUGE
EACH MISCELLANEOUS
Qty: 3 | Refills: 1 | Status: ACTIVE | COMMUNITY
Start: 2022-04-13 | End: 1900-01-01

## 2022-07-15 ENCOUNTER — APPOINTMENT (OUTPATIENT)
Dept: ENDOCRINOLOGY | Facility: CLINIC | Age: 22
End: 2022-07-15

## 2022-07-15 VITALS
HEIGHT: 68 IN | WEIGHT: 135 LBS | TEMPERATURE: 99 F | BODY MASS INDEX: 20.46 KG/M2 | OXYGEN SATURATION: 99 % | HEART RATE: 104 BPM | DIASTOLIC BLOOD PRESSURE: 80 MMHG | SYSTOLIC BLOOD PRESSURE: 118 MMHG

## 2022-07-15 PROCEDURE — 99214 OFFICE O/P EST MOD 30 MIN: CPT

## 2022-07-15 PROCEDURE — 82962 GLUCOSE BLOOD TEST: CPT

## 2022-07-15 PROCEDURE — 83036 HEMOGLOBIN GLYCOSYLATED A1C: CPT | Mod: QW

## 2022-07-15 NOTE — THERAPY
[FreeTextEntry7] : Insulin profile\par Midnight 0.95 units/h\par 6 AM 1.05 units/h\par 10 AM 1.15 units/h\par 12 PM 1.15 units/h\par 3 PM 1.3 units per\par 8 PM 1.2 units/h\par \par ISF\par Midnight 80\par 6 AM 75\par 8 PM 80\par \par Insulin to carbohydrate ratio\par Midnight 9.5\par 6 AM 7\par 12 PM 7.5\par 8 PM 7.5\par \par Target\par Midnight 110 mg/dL

## 2022-07-15 NOTE — ASSESSMENT
[Diabetes Foot Care] : diabetes foot care [Long Term Vascular Complications] : long term vascular complications of diabetes [Carbohydrate Consistent Diet] : carbohydrate consistent diet [Importance of Diet and Exercise] : importance of diet and exercise to improve glycemic control, achieve weight loss and improve cardiovascular health [Exercise/Effect on Glucose] : exercise/effect on glucose [Hypoglycemia Management] : hypoglycemia management [Glucagon Use] : glucagon use [Ketone Testing] : ketone testing [Action and use of Insulin] : action and use of short and long-acting insulin [Self Monitoring of Blood Glucose] : self monitoring of blood glucose [Insulin Self-Administration] : insulin self-administration [Injection Technique, Storage, Sharps Disposal] : injection technique, storage, and sharps disposal [Sick-Day Management] : sick-day management [Retinopathy Screening] : Patient was referred to ophthalmology for retinopathy screening [FreeTextEntry1] : 21-year-old male with type 1 diabetes, diagnosed at 18 months of age, here for endocrinology follow-up.\par \par 1.  Type 1 diabetes mellitus.\par Patient is currently using insulin pump with tandem, T slim, with control IQ technology.\par Patient recently changed his setting about 2 days ago.\par Patient will contact the office for me to logon and see his glycemic control in 2 weeks.\par For now continue with his current setting.\par Insulin pump setting\par Basal rate\par Midnight 0.95\par 6 AM 1.05\par 10 AM 1.15\par 12 PM 1.15\par 3 PM 1.3\par 10 PM 1.2\par \par Insulin to carbohydrate ratio\par Midnight 9.5 g/unit\par 6 AM 7 g/unit\par 12 PM 7.5 g/unit\par 3PM 7 gram\par 10pm 7.5 gram/unit\par \par MId 1:80\par 6am 1:75\par 10am 1:75\par 10pm 1:80\par \par We will send annual diabetes type 1 labs including microalbumin/creatinine ratio, lipid profile and thyroid function as well as celiac screening\par \par CDE FU in 6 months\par FU with me afterwards. \par Call the office if any hyper or hypoglycemia. \par Has ketone, glucagon and all supplies.

## 2022-07-15 NOTE — HISTORY OF PRESENT ILLNESS
[FreeTextEntry1] : 22-year-old man with history of type 1 diabetes\par Complicated by hypoglycemic seizure.  This occurred around 2014.\par Has fairly well-controlled A1c.\par No known history of diabetic ketoacidosis.\par Patient is entering graduate school for biomedical engineering.\par Patient is currently on t:slim with control IQ since July 2020.\par Denies any history of hypoglycemic events recently.\par Reports that his glucose has been uptrending over the last few weeks.  No recent triggers.  Not on steroids.\par He is up-to-date with his ophthalmologist.\par He does not see a podiatrist but has no issues with his feet.\par No history of GI symptoms. \par No signs or symptoms of hypothyroidism.\par Doing well overall\par

## 2022-07-22 LAB
ALBUMIN SERPL ELPH-MCNC: 4.7 G/DL
ALP BLD-CCNC: 47 U/L
ALT SERPL-CCNC: 13 U/L
ANION GAP SERPL CALC-SCNC: 9 MMOL/L
AST SERPL-CCNC: 14 U/L
BILIRUB SERPL-MCNC: 0.6 MG/DL
BUN SERPL-MCNC: 10 MG/DL
CALCIUM SERPL-MCNC: 9.7 MG/DL
CHLORIDE SERPL-SCNC: 101 MMOL/L
CHOLEST SERPL-MCNC: 200 MG/DL
CO2 SERPL-SCNC: 28 MMOL/L
CREAT SERPL-MCNC: 0.77 MG/DL
CREAT SPEC-SCNC: 141 MG/DL
EGFR: 128 ML/MIN/1.73M2
GLUCOSE BLDC GLUCOMTR-MCNC: 99
GLUCOSE SERPL-MCNC: 104 MG/DL
HBA1C MFR BLD HPLC: 6.7
HDLC SERPL-MCNC: 87 MG/DL
LDLC SERPL CALC-MCNC: 105 MG/DL
MICROALBUMIN 24H UR DL<=1MG/L-MCNC: <1.2 MG/DL
MICROALBUMIN/CREAT 24H UR-RTO: NORMAL MG/G
NONHDLC SERPL-MCNC: 113 MG/DL
POTASSIUM SERPL-SCNC: 4.1 MMOL/L
PROT SERPL-MCNC: 7.3 G/DL
SODIUM SERPL-SCNC: 138 MMOL/L
TRIGL SERPL-MCNC: 38 MG/DL
TSH SERPL-ACNC: 1.33 UIU/ML
TTG IGA SER IA-ACNC: <1.2 U/ML
TTG IGA SER-ACNC: NEGATIVE
TTG IGG SER IA-ACNC: <1.2 U/ML
TTG IGG SER IA-ACNC: NEGATIVE

## 2022-09-20 ENCOUNTER — RX RENEWAL (OUTPATIENT)
Age: 22
End: 2022-09-20

## 2023-01-23 ENCOUNTER — APPOINTMENT (OUTPATIENT)
Dept: ENDOCRINOLOGY | Facility: CLINIC | Age: 23
End: 2023-01-23
Payer: COMMERCIAL

## 2023-01-23 VITALS
HEART RATE: 111 BPM | OXYGEN SATURATION: 98 % | HEIGHT: 68 IN | SYSTOLIC BLOOD PRESSURE: 124 MMHG | BODY MASS INDEX: 20.92 KG/M2 | WEIGHT: 138 LBS | DIASTOLIC BLOOD PRESSURE: 88 MMHG

## 2023-01-23 PROCEDURE — 99214 OFFICE O/P EST MOD 30 MIN: CPT

## 2023-01-23 RX ORDER — BLOOD-GLUCOSE METER, WIRELESS
W/DEVICE KIT MISCELLANEOUS
Qty: 1 | Refills: 0 | Status: DISCONTINUED | COMMUNITY
Start: 2018-05-14 | End: 2023-01-23

## 2023-01-23 RX ORDER — BLOOD SUGAR DIAGNOSTIC
STRIP MISCELLANEOUS
Qty: 150 | Refills: 5 | Status: DISCONTINUED | COMMUNITY
Start: 2018-05-14 | End: 2023-01-23

## 2023-01-23 NOTE — ASSESSMENT
[FreeTextEntry1] : 22-year-old male with type 1 diabetes, diagnosed at 18 months of age, here for endocrinology follow-up.\par \par 1.  Type 1 diabetes mellitus.\par A1c 7.1%.\par CGM data showing that patient was above target 53% of the time over the last 14 days.\par Recently getting over a cold.\par Glucose level has been much better as evident by CGM download on 1/22/2023.\par For now we will not change his regimen.\par Continue with current setting.\par Patient is currently using insulin pump with tandem, T slim, with control IQ technology.\par Patient recently changed his setting about 2 days ago.\par Patient will contact the office for me to logon and see his glycemic control in 2 weeks.\par For now continue with his current setting.\par Insulin pump setting\par Basal rate\par Midnight 0.95\par 6 AM 1.05\par 10 AM 1.15\par 12 PM 1.15\par 3 PM 1.3\par 10 PM 1.2\par 20 total 27 units\par \par Insulin to carbohydrate ratio\par Midnight 9.5 g/unit\par 6 AM 7 g/unit\par 12 PM 7.5 g/unit\par 3PM 7 gram\par 10pm 7.5 gram/unit\par \par MId 1:80\par 6am 1:75\par 10am 1:75\par 10pm 1:80\par \par * Up to date with eye doctor, last seen in September 2022, no changes\par * Saw podiatry for a wart at the bottom of his feet.  \par \par CDE FU in 6 months\par FU with me afterwards. \par Call the office if any hyper or hypoglycemia. \par Has ketone, glucagon and all supplies.

## 2023-01-23 NOTE — HISTORY OF PRESENT ILLNESS
[FreeTextEntry1] : 22-year-old man with history of type 1 diabetes\par \par Complicated by hypoglycemic seizure.  This occurred around 2014.\par Has fairly well-controlled A1c.\par No known history of diabetic ketoacidosis.\par Patient is entering graduate school for biomedical engineering.\par Patient is currently on t:slim with control IQ since July 2020.\par \par Denies any history of hypoglycemic events recently.\par Reports that his glucose has been uptrending over the last few weeks.  No recent triggers.  Not on steroids.\par He is up-to-date with his ophthalmologist.\par He does not see a podiatrist but has no issues with his feet.\par No history of GI symptoms. \par No signs or symptoms of hypothyroidism.\par Doing well overall. \par \par He had a cold about 2 weeks ago.  Glucose has been trending high.\par Noticed some improvement since yesterday.\par Is using t:slim insulin pump with control IQ technology.\par Time in use 99% of the time.\par Basal 58%\par Fluid bolus 40%\par Below less than 1%\par

## 2023-01-23 NOTE — PHYSICAL EXAM
[Alert] : alert [Well Nourished] : well nourished [No Acute Distress] : no acute distress [Well Developed] : well developed [Normal Sclera/Conjunctiva] : normal sclera/conjunctiva [EOMI] : extra ocular movement intact [No Proptosis] : no proptosis [Normal Oropharynx] : the oropharynx was normal [Thyroid Not Enlarged] : the thyroid was not enlarged [No Thyroid Nodules] : no palpable thyroid nodules [No Respiratory Distress] : no respiratory distress [No Accessory Muscle Use] : no accessory muscle use [Clear to Auscultation] : lungs were clear to auscultation bilaterally [Normal S1, S2] : normal S1 and S2 [Normal Rate] : heart rate was normal [Regular Rhythm] : with a regular rhythm [No Edema] : no peripheral edema [Pedal Pulses Normal] : the pedal pulses are present [Normal Bowel Sounds] : normal bowel sounds [Not Tender] : non-tender [Not Distended] : not distended [Soft] : abdomen soft [Normal Anterior Cervical Nodes] : no anterior cervical lymphadenopathy [No Spinal Tenderness] : no spinal tenderness [Spine Straight] : spine straight [No Stigmata of Cushings Syndrome] : no stigmata of Cushings Syndrome [Normal Gait] : normal gait [No Rash] : no rash [Normal Strength/Tone] : muscle strength and tone were normal [Acanthosis Nigricans] : no acanthosis nigricans [Normal] : normal [Full ROM] : with full range of motion [Diminished Throughout Both Feet] : normal tactile sensation with monofilament testing throughout both feet [Normal Reflexes] : deep tendon reflexes were 2+ and symmetric [No Tremors] : no tremors [Oriented x3] : oriented to person, place, and time

## 2023-08-15 ENCOUNTER — APPOINTMENT (OUTPATIENT)
Dept: ENDOCRINOLOGY | Facility: CLINIC | Age: 23
End: 2023-08-15
Payer: COMMERCIAL

## 2023-08-15 VITALS
WEIGHT: 145 LBS | BODY MASS INDEX: 21.48 KG/M2 | SYSTOLIC BLOOD PRESSURE: 132 MMHG | HEIGHT: 69 IN | HEART RATE: 89 BPM | DIASTOLIC BLOOD PRESSURE: 68 MMHG | OXYGEN SATURATION: 98 %

## 2023-08-15 PROCEDURE — 95251 CONT GLUC MNTR ANALYSIS I&R: CPT

## 2023-08-15 PROCEDURE — 99214 OFFICE O/P EST MOD 30 MIN: CPT | Mod: 25

## 2023-08-15 NOTE — HISTORY OF PRESENT ILLNESS
[FreeTextEntry1] : 23-year-old man with history of type 1 diabetes  Complicated by hypoglycemic seizure.  This occurred around 2014.  No known history of diabetic ketoacidosis.  Patient is entering graduate school for biomedical engineering. Patient is currently on t:slim with control IQ since July 2020.  Denies any history of hypoglycemic events recently. Reports that his glucose has been uptrending over the last few weeks.  No recent triggers.  Not on steroids. He is up to date with his ophthalmologist. He does not see a podiatrist but has no issues with his feet. No history of GI symptoms.  No signs or symptoms of hypothyroidism. Doing well overall.

## 2023-08-15 NOTE — ASSESSMENT
[FreeTextEntry1] : 23-year-old male with type 1 diabetes, diagnosed at 18 months of age, here for endocrinology follow-up.  1.  Type 1 diabetes mellitus. A1c 7.1%. CGM data showing that patient was above target 53% of the time over the last 14 days. Recently getting over a cold. Glucose level has been much better as evident by CGM download on 1/22/2023. For now we will not change his regimen. Continue with current setting. Patient is currently using insulin pump with tandem, T slim, with control IQ technology. Patient recently changed his setting about 2 days ago. Patient will contact the office for me to logon and see his glycemic control in 2 weeks. For now continue with his current setting. Insulin pump setting PUMP SETTINGS Insulin used: Novolog Start Time     Basal Rates     Target BG     Insulin to Carb Ratio     Sensitivity (ISF)                (Units/hr)                   (mg/dL)       (g/Unit)                  (mg/dL/Unit) 12:00 AM       0.95 ==> 1.05         110           9.5                       80 6:00 AM        1.05                         110           7                         75 10:00 AM       1.15                        110           7                         75 12:00 PM       1.15                        110           7.5                       75 3:00 PM        1.3                           110           7                         75 8:00 PM        1.2                           110           7.5                       80 Total          26.95  Active Insulin Time: 3 hours  * Up to date with eye doctor, last seen in September 2022, no changes * Saw podiatry for a wart at the bottom of his feet.    FU with me in 6 months Blood work in 3 months

## 2023-08-15 NOTE — THERAPY
[Today's Date] : [unfilled] [Novolog] : Novolog [TextEntry] :  - - - - - - - - - - - - - - - - - - - - - - - - - - - SUMMARY - CGM  GMI (CGM):                               7.1% (53.9 mmol/mol) Average Glucose (CGM):                   158 mg/dL Median (CGM):                            146 mg/dL SD (CGM):                                55 mg/dL CV (CGM):                                35.1% % Time CGM Active:                       95.5% (13.4 days) Highest (CGM):                           374 mg/dL Lowest (CGM):                            40 mg/dL  CGM Average Daily Time in Range (mg/dL) % Readings < 54:                         0% % Readings < 70:                         1% % Readings :                       69% % Readings > 180:                        30% % Readings > 250:                        7%  SUMMARY - BG  Average Glucose (BG):          171 mg/dL Median (BG):                   160 mg/dL SD (BG):                       64 mg/dL Readings/Day:                  5 Highest (BG):                  341 mg/dL Lowest (BG):                   84 mg/dL  BG Average % in Range (mg/dL) % Readings < 54:               0% % Readings < 70:               0% % Readings :             63% % Readings > 180:              37% % Readings > 250:              16%  - - - - - - - - - - - - - - - - - - - - - - - - - - - TIME OF DAY - CGM                            Morning        Afternoon      Evening       Night                           (5AM-10AM)     (10AM-3PM)     (3PM-9PM)     (9PM-5AM) % Readings < 70 mg/dL:    0%             2%             0%            1% % Readings  mg/dL:  82%            86%            57%           58% % Readings > 180 mg/dL:   18%            12%            43%           41%  Readings:                 881            858            875           1307 Average (mg/dL):          140            141            170           172 SD (mg/dL):               44             43             63            57  TIME OF DAY - BG                            Morning        Afternoon      Evening       Night                           (5AM-10AM)     (10AM-3PM)     (3PM-9PM)     (9PM-5AM) % Readings < 70 mg/dL:    0%             0%             0%            0% % Readings  mg/dL:  75%            89%            38%           62% % Readings > 180 mg/dL:   25%            11%            62%           38%  Readings:                 4              19             21            26 Average (mg/dL):          154            137            197           177 SD (mg/dL):               48             40             69            67  - - - - - - - - - - - - - - - - - - - - - - - - - - - INSULIN  Daily Dose: 53.7 units Overrides (%): 6.4% (7 boluses) # Bolus/Day: 7.8 Bolus %/Day: 45% Basal %/Day: 55% Average Bolus: 3.1 units Correction Bolus/Day: 4.2 (54%)  - - - - - - - - - - - - - - - - - - - - - - - - - - - LGS/PLGS  Time Suspended/Day: 1 h 34 m Avg Suspensions/Day: 4.7  Avg Suspensions/Time of Day Mornin% Afternoon: 26% Evenin% Night: 27%  - - - - - - - - - - - - - - - - - - - - - - - - - - - ACTIVITY  Carbs/Day: 164.6 Carb Entries/Day: 4.5  - - - - - - - - - - - - - - - - - - - - - - - - - - - DEVICES  Device Name: Tandem t:slim X2 Serial Number: [id number] Sync Date: 08/15/23 Device Time Offset (hh:mm): +00:00  - - - - - - - - - - - - - - - - - - - - - - - - - - - PUMP SETTINGS  Normal - current Start Time     Basal Rates     Target BG     Insulin to Carb Ratio     Sensitivity (ISF)                (Units/hr)      (mg/dL)       (g/Unit)                  (mg/dL/Unit) 12:00 AM       0.95            110           9.5                       80 6:00 AM        1.05            110           7                         75 10:00 AM       1.15            110           7                         75 12:00 PM       1.15            110           7.5                       75 3:00 PM        1.3             110           7                         75 8:00 PM        1.2             110           7.5                       80 Total          26.95  Active Insulin Time: 3 hours

## 2023-08-22 ENCOUNTER — APPOINTMENT (OUTPATIENT)
Dept: ENDOCRINOLOGY | Facility: CLINIC | Age: 23
End: 2023-08-22

## 2023-11-22 ENCOUNTER — TRANSCRIPTION ENCOUNTER (OUTPATIENT)
Age: 23
End: 2023-11-22

## 2024-02-13 ENCOUNTER — APPOINTMENT (OUTPATIENT)
Dept: ENDOCRINOLOGY | Facility: CLINIC | Age: 24
End: 2024-02-13

## 2024-02-13 DIAGNOSIS — Z46.81 ENCOUNTER FOR FITTING AND ADJUSTMENT OF INSULIN PUMP: ICD-10-CM

## 2024-02-13 DIAGNOSIS — E10.9 TYPE 1 DIABETES MELLITUS W/OUT COMPLICATIONS: ICD-10-CM

## 2024-02-13 DIAGNOSIS — Z96.41 PRESENCE OF INSULIN PUMP (EXTERNAL) (INTERNAL): ICD-10-CM

## 2024-02-13 NOTE — HISTORY OF PRESENT ILLNESS
[Continuous Glucose Monitoring] : Continuous Glucose Monitoring: Yes [Dexcom] : Dexcom [FreeTextEntry1] : 23-year-old man with history of type 1 diabetes  Complicated by hypoglycemic seizure.  This occurred around 2014.  No known history of diabetic ketoacidosis.  Patient is entering graduate school for biomedical engineering. Patient is currently on t:slim with control IQ since July 2020.  Denies any history of hypoglycemic events recently. Reports that his glucose has been uptrending over the last few weeks.  No recent triggers.  Not on steroids. He is up to date with his ophthalmologist. He does not see a podiatrist but has no issues with his feet. No history of GI symptoms.  No signs or symptoms of hypothyroidism.  Patient is currently doing well.  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   [TextEntry] : Control IQ technology Active 84% of the time Average glucose reading 161 mg/dL Time CGM use 86% Standard deviation 62 mg/dL Coefficient of variation 39 Last 2 weeks, time in range 67% of the time. Average daily dose at 48.46 Basal 60% Boluses 40% Control IQ 52% of the time.

## 2024-02-13 NOTE — PHYSICAL EXAM
[Alert] : alert [Well Nourished] : well nourished [No Acute Distress] : no acute distress [Well Developed] : well developed [Normal Sclera/Conjunctiva] : normal sclera/conjunctiva [EOMI] : extra ocular movement intact [No Proptosis] : no proptosis [Normal Oropharynx] : the oropharynx was normal [No Respiratory Distress] : no respiratory distress [No Accessory Muscle Use] : no accessory muscle use [Not Distended] : not distended [No Stigmata of Cushings Syndrome] : no stigmata of Cushings Syndrome [Oriented x3] : oriented to person, place, and time [Normal Affect] : the affect was normal [Normal Insight/Judgement] : insight and judgment were intact [Normal Mood] : the mood was normal [Acanthosis Nigricans] : no acanthosis nigricans

## 2024-02-13 NOTE — THERAPY
[TextEntry] : Insulin pump setting 12 AM 1.05 units/h 10 AM 1.150 units/h 3 PM 1.3 units/h 8 PM 1.2 units/h Total daily basal 27.550 units per 24-hour Correction factor Midnight 1: 80 mg/dL 6 AM 1: 75 mg/dL 8 PM 1: 80 mg/dL Carbohydrate ratio 12 AM 9.5 g/units 6 AM 7.0 g/unit 12 PM 7.5 g. 3 PM 7 g. APN 7.9 g/unit Target glucose Midnight 110 mg/dL

## 2024-02-13 NOTE — ASSESSMENT
[FreeTextEntry1] : 23-year-old male with type 1 diabetes, diagnosed at 18 months of age, here for endocrinology follow-up.  1.  Type 1 diabetes mellitus. Will check A1c via blood work today. Notable for postprandial hyperglycemia after dinnertime.  He usually has dinner around 7 PM.  Will change carb ratio at 3 PM from 1: 7 g/unit to 1: 6.5 g/unit.  Changes as reflected in the note below. Patient is currently using insulin pump with tandem, T slim, with control [employer] technology. Patient recently changed his setting about 2 days ago. Patient will contact the office for me to logon and see his glycemic control in 2 weeks. For now continue with his current setting. Insulin pump setting 12 AM 1.05 units/h 10 AM 1.150 units/h 3 PM 1.3 units/h 8 PM 1.2 units/h Total daily basal 27.550 units per 24-hour Correction factor Midnight 1: 80 mg/dL 6 AM 1: 75 mg/dL 8 PM 1: 80 mg/dL Carbohydrate ratio 12 AM 9.5 g/units 6 AM 7.0 g/unit 12 PM 7.5 g. 3 PM 7 g ==> 6.5 gram/unit 8pm 7.9 g/unit Target glucose Midnight 110 mg/dL  Up-to-date with ophthalmologist. Up-to-date with podiatrist Will check TFT and celiac panel this year. Blood work will be emailed to patient's address.  FU with me in 6 months

## 2024-02-27 LAB
ALBUMIN SERPL ELPH-MCNC: 4.6 G/DL
ALP BLD-CCNC: 53 U/L
ALT SERPL-CCNC: 21 U/L
ANION GAP SERPL CALC-SCNC: 10 MMOL/L
AST SERPL-CCNC: 18 U/L
BILIRUB SERPL-MCNC: 0.3 MG/DL
BUN SERPL-MCNC: 15 MG/DL
CALCIUM SERPL-MCNC: 10.1 MG/DL
CHLORIDE SERPL-SCNC: 102 MMOL/L
CHOLEST SERPL-MCNC: 193 MG/DL
CO2 SERPL-SCNC: 29 MMOL/L
CREAT SERPL-MCNC: 0.8 MG/DL
CREAT SPEC-SCNC: 178 MG/DL
EGFR: 126 ML/MIN/1.73M2
ESTIMATED AVERAGE GLUCOSE: 148 MG/DL
GLUCOSE SERPL-MCNC: 165 MG/DL
HBA1C MFR BLD HPLC: 6.8 %
HDLC SERPL-MCNC: 79 MG/DL
LDLC SERPL CALC-MCNC: 97 MG/DL
MICROALBUMIN 24H UR DL<=1MG/L-MCNC: 2.2 MG/DL
MICROALBUMIN/CREAT 24H UR-RTO: 13 MG/G
NONHDLC SERPL-MCNC: 114 MG/DL
POTASSIUM SERPL-SCNC: 4.9 MMOL/L
PROT SERPL-MCNC: 7.1 G/DL
SODIUM SERPL-SCNC: 140 MMOL/L
T4 FREE SERPL-MCNC: 1.2 NG/DL
TRIGL SERPL-MCNC: 95 MG/DL
TSH SERPL-ACNC: 1.23 UIU/ML
TTG IGA SER IA-ACNC: <1.2 U/ML
TTG IGA SER-ACNC: NEGATIVE
TTG IGG SER IA-ACNC: 2.2 U/ML
TTG IGG SER IA-ACNC: NEGATIVE

## 2024-03-05 RX ORDER — INSULIN ASPART 100 [IU]/ML
100 INJECTION, SOLUTION INTRAVENOUS; SUBCUTANEOUS
Qty: 90 | Refills: 1 | Status: ACTIVE | COMMUNITY
Start: 2022-04-08 | End: 1900-01-01

## 2024-06-27 ENCOUNTER — TRANSCRIPTION ENCOUNTER (OUTPATIENT)
Age: 24
End: 2024-06-27

## 2024-06-28 ENCOUNTER — TRANSCRIPTION ENCOUNTER (OUTPATIENT)
Age: 24
End: 2024-06-28

## 2024-07-27 ENCOUNTER — NON-APPOINTMENT (OUTPATIENT)
Age: 24
End: 2024-07-27

## 2024-07-27 DIAGNOSIS — E10.9 TYPE 1 DIABETES MELLITUS W/OUT COMPLICATIONS: ICD-10-CM

## 2024-07-27 RX ORDER — SYRING-NEEDL,DISP,INSUL,0.3 ML 29 G X1/2"
29G X 1/2" SYRINGE, EMPTY DISPOSABLE MISCELLANEOUS
Qty: 1 | Refills: 5 | Status: ACTIVE | COMMUNITY
Start: 2024-07-27 | End: 1900-01-01

## 2024-08-13 RX ORDER — BLOOD-GLUCOSE SENSOR
EACH MISCELLANEOUS
Qty: 9 | Refills: 3 | Status: ACTIVE | COMMUNITY
Start: 2024-08-13 | End: 1900-01-01

## 2024-08-21 ENCOUNTER — APPOINTMENT (OUTPATIENT)
Dept: ENDOCRINOLOGY | Facility: CLINIC | Age: 24
End: 2024-08-21
Payer: COMMERCIAL

## 2024-08-21 VITALS
HEIGHT: 69 IN | DIASTOLIC BLOOD PRESSURE: 84 MMHG | OXYGEN SATURATION: 99 % | SYSTOLIC BLOOD PRESSURE: 124 MMHG | HEART RATE: 75 BPM | WEIGHT: 137 LBS | BODY MASS INDEX: 20.29 KG/M2

## 2024-08-21 LAB
ALBUMIN SERPL ELPH-MCNC: 4.5 G/DL
ALP BLD-CCNC: 49 U/L
ALT SERPL-CCNC: 15 U/L
ANION GAP SERPL CALC-SCNC: 12 MMOL/L
AST SERPL-CCNC: 16 U/L
BILIRUB SERPL-MCNC: 0.4 MG/DL
BUN SERPL-MCNC: 12 MG/DL
CALCIUM SERPL-MCNC: 9.8 MG/DL
CHLORIDE SERPL-SCNC: 102 MMOL/L
CHOLEST SERPL-MCNC: 200 MG/DL
CO2 SERPL-SCNC: 26 MMOL/L
CREAT SERPL-MCNC: 0.88 MG/DL
EGFR: 122 ML/MIN/1.73M2
ESTIMATED AVERAGE GLUCOSE: 169 MG/DL
GLUCOSE SERPL-MCNC: 166 MG/DL
HBA1C MFR BLD HPLC: 7.1
HBA1C MFR BLD HPLC: 7.5 %
HDLC SERPL-MCNC: 88 MG/DL
LDLC SERPL CALC-MCNC: 104 MG/DL
NONHDLC SERPL-MCNC: 112 MG/DL
POTASSIUM SERPL-SCNC: 4.9 MMOL/L
PROT SERPL-MCNC: 6.9 G/DL
SODIUM SERPL-SCNC: 140 MMOL/L
TRIGL SERPL-MCNC: 42 MG/DL
TSH SERPL-ACNC: 0.97 UIU/ML

## 2024-08-21 PROCEDURE — 83036 HEMOGLOBIN GLYCOSYLATED A1C: CPT | Mod: QW

## 2024-08-21 PROCEDURE — 99214 OFFICE O/P EST MOD 30 MIN: CPT

## 2024-08-21 NOTE — THERAPY
[TextEntry] : Device Name: Tandem t:slim X2 Serial Number: [id number] Sync Date: 08/15/23 Device Time Offset (hh:mm): +00:00  - - - - - - - - - - - - - - - - - - - - - - - - - - - PUMP SETTINGS  Insulin pump setting T:slim Midnight 1.05 units/h 10 AM 1.15 units/h 3 PM 1.3 units/h 8 PM 1.2 units/h 24-hour total 27.55 units/day  Insulin to carbohydrate ratio 12 AM 9.5 g/unit 6 AM 7 g/unit 12 PM 7.5 g/unit 3 PM 6.5 g/unit 8 PM 7.5 g/unit  ISF 12 AM 80 mg/dL/unit 6 AM 75 mg/dL/unit 8 PM 80 mg/dL/unit  Glucose target Midnight 110 mg/dL  Active insulin time 5 hours

## 2024-08-21 NOTE — ASSESSMENT
[FreeTextEntry1] : 24-year-old male with type 1 diabetes, diagnosed at 18 months of age, here for endocrinology follow-up.  1.  Type 1 diabetes mellitus. Working 8 hour shift overnight. 5 Days a week.  Sleeps from 7;30am to 2pm.  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  Noted to have hypoglycemia after correction for hyperglycemia, will adjust ISF to 1:80 mg/dl throughout the day.   Insulin pump setting T:slim Midnight 1.05 units/h 10 AM 1.15 units/h 3 PM 1.3 units/h 8 PM 1.2 units/h 24-hour total 27.55 units/day  Insulin to carbohydrate ratio 12 AM 9.5 g/unit 6 AM 7 g/unit 12 PM 7.5 g/unit 3 PM 6.5 g/unit 8 PM 7.5 g/unit  ISF 12 AM 80 mg/dL/unit 6 AM 75 mg/dL/unit  to 80 mg/dl/unit 8 PM 80 mg/dL/unit  Glucose target Midnight 110 mg/dL   Getting Drill Map for masters degrees.   Up-to-date with ophthalmologist. Up-to-date with podiatrist Will check TFT and celiac panel this year. Blood work will be emailed to patient's address.   FU with me in 6 months

## 2024-08-21 NOTE — HISTORY OF PRESENT ILLNESS
[FreeTextEntry1] : 24-year-old man with history of type 1 diabetes  Complicated by hypoglycemic seizure.  This occurred around 2014.  No known history of diabetic ketoacidosis.  Patient is entering graduate school for biomedical engineering. Patient is currently on t:slim with control IQ since July 2020.  Denies any history of hypoglycemic events recently. Reports that his glucose has been uptrending over the last few weeks.  No recent triggers.  Not on steroids. He is up to date with his ophthalmologist. He does not see a podiatrist but has no issues with his feet. No history of GI symptoms.  No signs or symptoms of hypothyroidism.  Patient is currently doing well.  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   Thinking of biomedical engineering for graduate school.    [TextEntry] :  Key Stats Below Copied to Clipboard Name: Rex Carson YOB: 2000 Report Created: 08/21/2024 Date Range: Aug 08, 2024 - Aug 21, 2024  - - - - - - - - - - - - - - - - - - - - - - - - - - - SUMMARY - CGM  GMI (CGM):                               N/A Average Glucose (CGM):                   184 mg/dL Median (CGM):                            168 mg/dL SD (CGM):                                76 mg/dL CV (CGM):                                41% % Time CGM Active:                       47.7% (6.7 days) Highest (CGM):                           HI mg/dL Lowest (CGM):                            LO mg/dL  CGM Average Daily Time in Range (mg/dL) % Readings < 54:                         2% % Readings < 70:                         3% % Readings :                       54% % Readings > 180:                        43% % Readings > 250:                        17%  SUMMARY - BG  Average Glucose (BG):          204 mg/dL Median (BG):                   195 mg/dL SD (BG):                       86 mg/dL Readings/Day:                  2.8 Highest (BG):                  392 mg/dL Lowest (BG):                   63 mg/dL  BG Average % in Range (mg/dL) % Readings < 54:               0% % Readings < 70:               5% % Readings :             39% % Readings > 180:              56% % Readings > 250:              28%

## 2024-08-22 LAB
CREAT SPEC-SCNC: 218 MG/DL
MICROALBUMIN 24H UR DL<=1MG/L-MCNC: <1.2 MG/DL
MICROALBUMIN/CREAT 24H UR-RTO: NORMAL MG/G

## 2024-08-29 ENCOUNTER — TRANSCRIPTION ENCOUNTER (OUTPATIENT)
Age: 24
End: 2024-08-29

## 2024-08-29 RX ORDER — GLUCAGON INJECTION, SOLUTION 1 MG/.2ML
1 INJECTION, SOLUTION SUBCUTANEOUS
Qty: 1 | Refills: 1 | Status: ACTIVE | COMMUNITY
Start: 2024-08-29 | End: 1900-01-01

## 2024-11-06 ENCOUNTER — TRANSCRIPTION ENCOUNTER (OUTPATIENT)
Age: 24
End: 2024-11-06

## 2024-11-06 RX ORDER — BLOOD-GLUCOSE METER
W/DEVICE KIT MISCELLANEOUS
Qty: 1 | Refills: 1 | Status: ACTIVE | COMMUNITY
Start: 2024-11-06 | End: 1900-01-01

## 2024-11-06 RX ORDER — INSULIN ASPART 100 [IU]/ML
100 INJECTION, SOLUTION INTRAVENOUS; SUBCUTANEOUS
Qty: 15 | Refills: 3 | Status: ACTIVE | COMMUNITY
Start: 2024-11-06 | End: 1900-01-01

## 2025-01-03 ENCOUNTER — TRANSCRIPTION ENCOUNTER (OUTPATIENT)
Age: 25
End: 2025-01-03

## 2025-02-04 ENCOUNTER — APPOINTMENT (OUTPATIENT)
Dept: ENDOCRINOLOGY | Facility: CLINIC | Age: 25
End: 2025-02-04

## 2025-02-04 VITALS
OXYGEN SATURATION: 98 % | SYSTOLIC BLOOD PRESSURE: 120 MMHG | BODY MASS INDEX: 20.67 KG/M2 | HEART RATE: 76 BPM | DIASTOLIC BLOOD PRESSURE: 82 MMHG | WEIGHT: 140 LBS

## 2025-02-04 PROCEDURE — 99214 OFFICE O/P EST MOD 30 MIN: CPT

## 2025-02-04 PROCEDURE — 83036 HEMOGLOBIN GLYCOSYLATED A1C: CPT | Mod: QW

## 2025-02-05 LAB — HBA1C MFR BLD HPLC: 6.9

## 2025-03-11 ENCOUNTER — APPOINTMENT (OUTPATIENT)
Dept: ENDOCRINOLOGY | Facility: CLINIC | Age: 25
End: 2025-03-11

## 2025-06-04 ENCOUNTER — NON-APPOINTMENT (OUTPATIENT)
Age: 25
End: 2025-06-04

## 2025-06-04 ENCOUNTER — APPOINTMENT (OUTPATIENT)
Dept: DERMATOLOGY | Facility: CLINIC | Age: 25
End: 2025-06-04
Payer: COMMERCIAL

## 2025-06-04 VITALS — WEIGHT: 140 LBS | BODY MASS INDEX: 20.73 KG/M2 | HEIGHT: 69 IN

## 2025-06-04 DIAGNOSIS — L25.9 UNSPECIFIED CONTACT DERMATITIS, UNSPECIFIED CAUSE: ICD-10-CM

## 2025-06-04 PROCEDURE — 99204 OFFICE O/P NEW MOD 45 MIN: CPT

## 2025-06-04 RX ORDER — TACROLIMUS 1 MG/G
0.1 OINTMENT TOPICAL
Qty: 1 | Refills: 1 | Status: ACTIVE | COMMUNITY
Start: 2025-06-04 | End: 1900-01-01

## 2025-06-04 RX ORDER — TRIAMCINOLONE ACETONIDE 1 MG/G
0.1 OINTMENT TOPICAL
Qty: 1 | Refills: 1 | Status: ACTIVE | COMMUNITY
Start: 2025-06-04 | End: 1900-01-01

## 2025-09-09 ENCOUNTER — APPOINTMENT (OUTPATIENT)
Dept: ENDOCRINOLOGY | Facility: CLINIC | Age: 25
End: 2025-09-09
Payer: COMMERCIAL

## 2025-09-09 VITALS
DIASTOLIC BLOOD PRESSURE: 76 MMHG | OXYGEN SATURATION: 98 % | SYSTOLIC BLOOD PRESSURE: 118 MMHG | BODY MASS INDEX: 20.73 KG/M2 | HEIGHT: 69 IN | WEIGHT: 140 LBS | HEART RATE: 73 BPM

## 2025-09-09 DIAGNOSIS — E10.9 TYPE 1 DIABETES MELLITUS W/OUT COMPLICATIONS: ICD-10-CM

## 2025-09-09 DIAGNOSIS — Z96.41 PRESENCE OF INSULIN PUMP (EXTERNAL) (INTERNAL): ICD-10-CM

## 2025-09-09 DIAGNOSIS — Z46.81 ENCOUNTER FOR FITTING AND ADJUSTMENT OF INSULIN PUMP: ICD-10-CM

## 2025-09-09 LAB
GLUCOSE BLDC GLUCOMTR-MCNC: 205
HBA1C MFR BLD HPLC: 6.9

## 2025-09-09 PROCEDURE — G2211 COMPLEX E/M VISIT ADD ON: CPT

## 2025-09-09 PROCEDURE — 82962 GLUCOSE BLOOD TEST: CPT

## 2025-09-09 PROCEDURE — 83036 HEMOGLOBIN GLYCOSYLATED A1C: CPT | Mod: QW

## 2025-09-09 PROCEDURE — 99214 OFFICE O/P EST MOD 30 MIN: CPT

## 2025-09-10 LAB
25(OH)D3 SERPL-MCNC: 22 NG/ML
ALBUMIN SERPL ELPH-MCNC: 4.3 G/DL
ALBUMIN, RANDOM URINE: <1.2 MG/DL
ALP BLD-CCNC: 49 U/L
ALT SERPL-CCNC: 16 U/L
ANION GAP SERPL CALC-SCNC: 14 MMOL/L
AST SERPL-CCNC: 22 U/L
BASOPHILS # BLD AUTO: 0.04 K/UL
BASOPHILS NFR BLD AUTO: 0.8 %
BILIRUB SERPL-MCNC: 0.4 MG/DL
BUN SERPL-MCNC: 10 MG/DL
CALCIUM SERPL-MCNC: 9.5 MG/DL
CHLORIDE SERPL-SCNC: 101 MMOL/L
CHOLEST SERPL-MCNC: 210 MG/DL
CO2 SERPL-SCNC: 25 MMOL/L
CREAT SERPL-MCNC: 0.74 MG/DL
CREAT SPEC-SCNC: 129 MG/DL
EGFRCR SERPLBLD CKD-EPI 2021: 127 ML/MIN/1.73M2
EOSINOPHIL # BLD AUTO: 0.29 K/UL
EOSINOPHIL NFR BLD AUTO: 5.8 %
ESTIMATED AVERAGE GLUCOSE: 166 MG/DL
GLUCOSE SERPL-MCNC: 185 MG/DL
HBA1C MFR BLD HPLC: 7.4 %
HCT VFR BLD CALC: 47.3 %
HDLC SERPL-MCNC: 82 MG/DL
HGB BLD-MCNC: 14.8 G/DL
IMM GRANULOCYTES NFR BLD AUTO: 0.2 %
LDLC SERPL-MCNC: 108 MG/DL
LYMPHOCYTES # BLD AUTO: 1.86 K/UL
LYMPHOCYTES NFR BLD AUTO: 37.1 %
MAN DIFF?: NORMAL
MCHC RBC-ENTMCNC: 26.9 PG
MCHC RBC-ENTMCNC: 31.3 G/DL
MCV RBC AUTO: 86 FL
MICROALBUMIN/CREAT 24H UR-RTO: NORMAL MG/G
MONOCYTES # BLD AUTO: 0.54 K/UL
MONOCYTES NFR BLD AUTO: 10.8 %
NEUTROPHILS # BLD AUTO: 2.27 K/UL
NEUTROPHILS NFR BLD AUTO: 45.3 %
NONHDLC SERPL-MCNC: 128 MG/DL
PLATELET # BLD AUTO: 242 K/UL
POTASSIUM SERPL-SCNC: 4.3 MMOL/L
PROT SERPL-MCNC: 6.8 G/DL
RBC # BLD: 5.5 M/UL
RBC # FLD: 14.6 %
SODIUM SERPL-SCNC: 139 MMOL/L
T4 FREE SERPL-MCNC: 1.3 NG/DL
TRIGL SERPL-MCNC: 118 MG/DL
TSH SERPL-ACNC: 1.1 UIU/ML
TTG IGA SER IA-ACNC: <0.5 U/ML
TTG IGA SER-ACNC: NEGATIVE
TTG IGG SER IA-ACNC: <0.8 U/ML
TTG IGG SER IA-ACNC: NEGATIVE
WBC # FLD AUTO: 5.01 K/UL